# Patient Record
Sex: MALE | Race: BLACK OR AFRICAN AMERICAN | NOT HISPANIC OR LATINO
[De-identification: names, ages, dates, MRNs, and addresses within clinical notes are randomized per-mention and may not be internally consistent; named-entity substitution may affect disease eponyms.]

---

## 2022-03-07 PROBLEM — Z00.00 ENCOUNTER FOR PREVENTIVE HEALTH EXAMINATION: Status: ACTIVE | Noted: 2022-03-07

## 2022-03-09 ENCOUNTER — APPOINTMENT (OUTPATIENT)
Dept: HEART AND VASCULAR | Facility: CLINIC | Age: 72
End: 2022-03-09
Payer: MEDICARE

## 2022-03-09 ENCOUNTER — NON-APPOINTMENT (OUTPATIENT)
Age: 72
End: 2022-03-09

## 2022-03-09 VITALS
WEIGHT: 184 LBS | RESPIRATION RATE: 12 BRPM | SYSTOLIC BLOOD PRESSURE: 154 MMHG | HEIGHT: 70 IN | HEART RATE: 60 BPM | DIASTOLIC BLOOD PRESSURE: 84 MMHG | BODY MASS INDEX: 26.34 KG/M2

## 2022-03-09 DIAGNOSIS — Z82.49 FAMILY HISTORY OF ISCHEMIC HEART DISEASE AND OTHER DISEASES OF THE CIRCULATORY SYSTEM: ICD-10-CM

## 2022-03-09 DIAGNOSIS — R09.89 OTHER SPECIFIED SYMPTOMS AND SIGNS INVOLVING THE CIRCULATORY AND RESPIRATORY SYSTEMS: ICD-10-CM

## 2022-03-09 DIAGNOSIS — R01.1 CARDIAC MURMUR, UNSPECIFIED: ICD-10-CM

## 2022-03-09 DIAGNOSIS — Z78.9 OTHER SPECIFIED HEALTH STATUS: ICD-10-CM

## 2022-03-09 PROCEDURE — 93306 TTE W/DOPPLER COMPLETE: CPT

## 2022-03-09 PROCEDURE — 99204 OFFICE O/P NEW MOD 45 MIN: CPT | Mod: 25

## 2022-03-09 PROCEDURE — 93000 ELECTROCARDIOGRAM COMPLETE: CPT

## 2022-03-09 PROCEDURE — 93880 EXTRACRANIAL BILAT STUDY: CPT

## 2022-03-09 PROCEDURE — ZZZZZ: CPT

## 2022-03-09 NOTE — PHYSICAL EXAM

## 2022-03-09 NOTE — DISCUSSION/SUMMARY
[FreeTextEntry1] : 1. Hypertension: Suggest discontinuing metoprolol and starting amlodipine 5 mg daily. Patient does report in the past taking amlodipine and not feeling well with it advised to restart the amlodipine in the evening prior to bed and continue losartan hydrochlorothiazide. Advised to return in 4 weeks with his blood pressure machine for comparison the manual pressure reading. Patient does report an average blood pressure at home in the 130s over 40. Advised low salt diet and continue exercise\par 2. Shortness of breath on exertion: Will obtain an echocardiogram and rule out ischemic heart disease in this middle-aged male with hypertension and family history of early atherosclerotic heart disease. Will advise once results an exercise nuclear stress test as known.\par 3. Carotid bruit: Carotid duplex\par 4. Cardiac murmur: Echocardiogram

## 2022-03-09 NOTE — HISTORY OF PRESENT ILLNESS
[FreeTextEntry1] : 71-year-old male with a past medical history of hypertension comes in for evaluation of shortness of breath. Patient does report occasional shortness of breath on exertion with activity. However denies any chest discomfort PND or orthopnea.

## 2022-03-23 ENCOUNTER — APPOINTMENT (OUTPATIENT)
Dept: HEART AND VASCULAR | Facility: CLINIC | Age: 72
End: 2022-03-23
Payer: MEDICARE

## 2022-03-23 DIAGNOSIS — R06.02 SHORTNESS OF BREATH: ICD-10-CM

## 2022-03-23 PROCEDURE — 93015 CV STRESS TEST SUPVJ I&R: CPT

## 2022-04-27 ENCOUNTER — APPOINTMENT (OUTPATIENT)
Dept: HEART AND VASCULAR | Facility: CLINIC | Age: 72
End: 2022-04-27
Payer: MEDICARE

## 2022-04-27 VITALS
BODY MASS INDEX: 25.77 KG/M2 | HEART RATE: 70 BPM | RESPIRATION RATE: 14 BRPM | DIASTOLIC BLOOD PRESSURE: 94 MMHG | HEIGHT: 70 IN | SYSTOLIC BLOOD PRESSURE: 154 MMHG | WEIGHT: 180 LBS

## 2022-04-27 PROCEDURE — 99214 OFFICE O/P EST MOD 30 MIN: CPT

## 2022-04-27 RX ORDER — METOPROLOL TARTRATE 25 MG/1
25 TABLET, FILM COATED ORAL ONCE
Refills: 0 | Status: DISCONTINUED | COMMUNITY
End: 2022-04-27

## 2022-04-27 NOTE — PHYSICAL EXAM

## 2022-04-27 NOTE — REASON FOR VISIT
[Hypertension] : hypertension [FreeTextEntry1] : 71-year-old male a past medical history of hypertension comes in for followup. Unfortunately patient did not bring in his blood pressure machine for comparison to manual pressure readings. Has been checking his blood pressure at home which has been borderline since last visit after medication adjustment. Denies chest pain shortness of breath PND or orthopnea.

## 2022-04-27 NOTE — DISCUSSION/SUMMARY
[FreeTextEntry1] : 1. Hypertension: Blood pressure better but  clearly not at goal. Will increase diltiazem to 90 mg daily advised to continue amlodipine and losartan hydrochlorothiazide as currently dosed and to maintain a low-salt diet. Advised to return in 4-6 weeks for repeat blood pressure and at that time to bring in his blood pressure monitor for comparison to manual pressure.

## 2022-05-23 ENCOUNTER — APPOINTMENT (OUTPATIENT)
Dept: HEART AND VASCULAR | Facility: CLINIC | Age: 72
End: 2022-05-23
Payer: MEDICARE

## 2022-05-23 VITALS
HEART RATE: 72 BPM | WEIGHT: 180 LBS | SYSTOLIC BLOOD PRESSURE: 140 MMHG | RESPIRATION RATE: 14 BRPM | DIASTOLIC BLOOD PRESSURE: 90 MMHG | HEIGHT: 70 IN | BODY MASS INDEX: 25.77 KG/M2

## 2022-05-23 VITALS
HEART RATE: 76 BPM | HEIGHT: 70 IN | SYSTOLIC BLOOD PRESSURE: 140 MMHG | WEIGHT: 186 LBS | DIASTOLIC BLOOD PRESSURE: 80 MMHG | BODY MASS INDEX: 26.63 KG/M2 | RESPIRATION RATE: 12 BRPM

## 2022-05-23 PROCEDURE — 99213 OFFICE O/P EST LOW 20 MIN: CPT

## 2022-05-23 NOTE — DISCUSSION/SUMMARY
[FreeTextEntry1] : 1. Hypertension: Blood pressure adequately controlled on current regimen of diltiazem 60 mg losartan hydrochlorothiazide amlodipine. Patient's blood pressure monitor recorded a pressure of 154/84. The blood pressure readings on the machine were within normal limits. So at this time will continue current regimen of diltiazem 60 mg losartan hydrochlorothiazide 100-25 mg amlodipine 10 mg. Patient advised to continue diet and exercise will repeat visit in 3 months and if blood pressure remains within normal limits will maintain current regimen if not will adjust medication.

## 2022-05-23 NOTE — PHYSICAL EXAM

## 2022-05-23 NOTE — HISTORY OF PRESENT ILLNESS
[FreeTextEntry1] : 72-year-old male with a past medical history of hypertension comes in for followup. Since last visit patient has been feeling well however did not take the increased dose of diltiazem did not get it from the pharmacy. Patient has been continuing all his medications as last visit. Today he brought in his blood pressure monitor for comparison to manual pressure reading. Overall feels well denies any chest pain shortness of breath PND or orthopnea.

## 2022-09-26 ENCOUNTER — APPOINTMENT (OUTPATIENT)
Dept: HEART AND VASCULAR | Facility: CLINIC | Age: 72
End: 2022-09-26

## 2022-10-31 ENCOUNTER — NON-APPOINTMENT (OUTPATIENT)
Age: 72
End: 2022-10-31

## 2022-10-31 ENCOUNTER — APPOINTMENT (OUTPATIENT)
Dept: HEART AND VASCULAR | Facility: CLINIC | Age: 72
End: 2022-10-31
Payer: MEDICARE

## 2022-10-31 VITALS
WEIGHT: 176 LBS | DIASTOLIC BLOOD PRESSURE: 70 MMHG | HEIGHT: 70 IN | SYSTOLIC BLOOD PRESSURE: 150 MMHG | BODY MASS INDEX: 25.2 KG/M2 | RESPIRATION RATE: 12 BRPM | HEART RATE: 56 BPM

## 2022-10-31 VITALS — HEART RATE: 74 BPM | RESPIRATION RATE: 12 BRPM | SYSTOLIC BLOOD PRESSURE: 160 MMHG | DIASTOLIC BLOOD PRESSURE: 76 MMHG

## 2022-10-31 DIAGNOSIS — Z86.79 PERSONAL HISTORY OF OTHER DISEASES OF THE CIRCULATORY SYSTEM: ICD-10-CM

## 2022-10-31 DIAGNOSIS — R55 SYNCOPE AND COLLAPSE: ICD-10-CM

## 2022-10-31 PROCEDURE — 99214 OFFICE O/P EST MOD 30 MIN: CPT | Mod: 25

## 2022-10-31 PROCEDURE — 93000 ELECTROCARDIOGRAM COMPLETE: CPT

## 2022-10-31 RX ORDER — NIRMATRELVIR AND RITONAVIR 300-100 MG
20 X 150 MG & KIT ORAL
Qty: 30 | Refills: 0 | Status: DISCONTINUED | COMMUNITY
Start: 2022-06-25

## 2022-10-31 RX ORDER — CHLORHEXIDINE GLUCONATE, 0.12% ORAL RINSE 1.2 MG/ML
0.12 SOLUTION DENTAL
Qty: 473 | Refills: 0 | Status: DISCONTINUED | COMMUNITY
Start: 2022-10-24

## 2022-10-31 RX ORDER — LOSARTAN POTASSIUM 100 MG/1
100 TABLET, FILM COATED ORAL DAILY
Qty: 30 | Refills: 3 | Status: ACTIVE | COMMUNITY
Start: 2022-10-31 | End: 1900-01-01

## 2022-10-31 RX ORDER — LOSARTAN POTASSIUM AND HYDROCHLOROTHIAZIDE 25; 100 MG/1; MG/1
100-25 TABLET ORAL DAILY
Refills: 0 | Status: DISCONTINUED | COMMUNITY
End: 2022-10-31

## 2022-10-31 RX ORDER — DILTIAZEM HYDROCHLORIDE 60 MG/1
60 CAPSULE, EXTENDED RELEASE ORAL
Qty: 30 | Refills: 0 | Status: DISCONTINUED | COMMUNITY
End: 2022-10-31

## 2022-10-31 RX ORDER — AMLODIPINE BESYLATE 10 MG/1
10 TABLET ORAL
Refills: 0 | Status: DISCONTINUED | COMMUNITY
End: 2022-10-31

## 2022-10-31 RX ORDER — DILTIAZEM HYDROCHLORIDE 30 MG/1
30 TABLET ORAL
Qty: 36 | Refills: 0 | Status: DISCONTINUED | COMMUNITY
Start: 2022-02-05

## 2022-10-31 RX ORDER — PROMETHAZINE HYDROCHLORIDE 6.25 MG/5ML
6.25 SOLUTION ORAL
Qty: 473 | Refills: 0 | Status: DISCONTINUED | COMMUNITY
Start: 2022-07-02

## 2022-10-31 RX ORDER — NIFEDIPINE 60 MG/1
60 TABLET, EXTENDED RELEASE ORAL DAILY
Qty: 30 | Refills: 3 | Status: ACTIVE | COMMUNITY
Start: 2022-10-31 | End: 1900-01-01

## 2022-10-31 NOTE — HISTORY OF PRESENT ILLNESS
[FreeTextEntry1] : 72-year-old male with past medical history of hypertension comes in for evaluation of syncope.  Patient had 2 syncopal episodes  by months both preceded with a prodrome of lightheadedness diaphoresis nausea tachycardia and syncope.  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  Denied any chest pain shortness of breath palpitations prior to syncope.

## 2022-10-31 NOTE — PHYSICAL EXAM

## 2022-10-31 NOTE — DISCUSSION/SUMMARY
[FreeTextEntry1] : 1.  Syncope: Etiology unclear we will place a 3-day Holter monitor to assess for arrhythmia.  Patient to see his PCP tomorrow for routine blood work.  Recent echo and carotid reviewed both within normal limits.\par 2.  Hypertension: Patient with persistent hypokalemia will discontinue hydrochlorothiazide and diltiazem and start nifedipine 90 mg daily. [EKG obtained to assist in diagnosis and management of assessed problem(s)] : EKG obtained to assist in diagnosis and management of assessed problem(s)